# Patient Record
(demographics unavailable — no encounter records)

---

## 2024-10-23 NOTE — HISTORY OF PRESENT ILLNESS
[TextBox_4] : Patient coming for follow-up overall she is feeling better status post admission for hemorrhoidectomy did well She is still anemic getting iron infusion which did help her shortness of breath now denied cough or wheeze no shortness of breath she is on Wixela and Spiriva Asthma profile IgE CBC reviewed discussed with patient New CBC reviewed hemoglobin 9.9 Pending sleep study

## 2024-10-23 NOTE — REVIEW OF SYSTEMS
[Cough] : no cough [Chest Tightness] : no chest tightness [Wheezing] : no wheezing [GERD] : gerd [Anemia] : anemia [Negative] : Endocrine

## 2024-10-23 NOTE — PHYSICAL EXAM
[No Acute Distress] : no acute distress [IV] : Mallampati Class: IV [Normal Appearance] : normal appearance [No Neck Mass] : no neck mass [Normal Rate/Rhythm] : normal rate/rhythm [No Resp Distress] : no resp distress [Clear to Auscultation Bilaterally] : clear to auscultation bilaterally [No Abnormalities] : no abnormalities [Normal Gait] : normal gait [No Clubbing] : no clubbing [No Cyanosis] : no cyanosis [No Edema] : no edema [FROM] : FROM [No Focal Deficits] : no focal deficits [Oriented x3] : oriented x3 [Normal Affect] : normal affect

## 2024-10-23 NOTE — PLAN
[TextEntry] : continue spiriva and wixela  ct scan in 4 months  pending sleep study  will proceed with sleep study  counseled for weight reduction  told not to drive if feel tired  counseled for sleep hygiene iron infusion

## 2024-12-31 NOTE — PLAN
[TextEntry] : continue spiriva and wixela  azelastine as needed  ct scan reviewed  next visit will order asthma profile , IGE , cbc  again told need sleep study was ordered before

## 2024-12-31 NOTE — HISTORY OF PRESENT ILLNESS
[TextBox_4] : Patient coming for follow-up in November she was complaining of cough admitted to the hospital.  This told her that she has a pneumonia given antibiotic patient remained to have a cough went back had CT scan again which showed some rib fracture show patient has 2 CAT scan November both of them did not show any sign of pneumonia or opacity second was showed rib fracture patient denies any for she said that she do have osteoporosis currently her cough almost resolved no wheezing no chest pain asthma profile from before was reviewed was negative IgE was normal most likely patient was at prednisone Told the patient not to take any prednisone unless she talked with me even if ordered by her urgent care or PMD

## 2024-12-31 NOTE — REVIEW OF SYSTEMS
[Cough] : cough [GERD] : gerd [Anemia] : anemia [Negative] : Endocrine [Chest Tightness] : no chest tightness [Wheezing] : no wheezing

## 2025-03-13 NOTE — PHYSICAL EXAM
[General Appearance - Alert] : alert [General Appearance - In No Acute Distress] : in no acute distress [Sclera] : the sclera and conjunctiva were normal [PERRL With Normal Accommodation] : pupils were equal in size, round, and reactive to light [Extraocular Movements] : extraocular movements were intact [Outer Ear] : the ears and nose were normal in appearance [Oropharynx] : the oropharynx was normal [Neck Appearance] : the appearance of the neck was normal [Neck Cervical Mass (___cm)] : no neck mass was observed [Jugular Venous Distention Increased] : there was no jugular-venous distention [Thyroid Diffuse Enlargement] : the thyroid was not enlarged [Thyroid Nodule] : there were no palpable thyroid nodules [Auscultation Breath Sounds / Voice Sounds] : lungs were clear to auscultation bilaterally [Heart Rate And Rhythm] : heart rate was normal and rhythm regular [Heart Sounds] : normal S1 and S2 [Heart Sounds Gallop] : no gallops [Murmurs] : no murmurs [Heart Sounds Pericardial Friction Rub] : no pericardial rub [Examination Of The Chest] : the chest was normal in appearance [Chest Visual Inspection Thoracic Asymmetry] : no chest asymmetry [Bowel Sounds] : normal bowel sounds [Abdomen Soft] : soft [Abdomen Tenderness] : non-tender [Abdomen Mass (___ Cm)] : no abdominal mass palpated [Abnormal Walk] : normal gait [Nail Clubbing] : no clubbing  or cyanosis of the fingernails [Musculoskeletal - Swelling] : no joint swelling seen [Motor Tone] : muscle strength and tone were normal [Skin Color & Pigmentation] : normal skin color and pigmentation [Skin Turgor] : normal skin turgor [] : no rash [Deep Tendon Reflexes (DTR)] : deep tendon reflexes were 2+ and symmetric [Sensation] : the sensory exam was normal to light touch and pinprick [No Focal Deficits] : no focal deficits [Oriented To Time, Place, And Person] : oriented to person, place, and time [Impaired Insight] : insight and judgment were intact [Affect] : the affect was normal [FreeTextEntry1] : Left posterior thorax tender to palpation

## 2025-03-13 NOTE — HISTORY OF PRESENT ILLNESS
[FreeTextEntry1] :  Ms. SANDRA CHUNG is a 58 year F, former smoker, (social smoker x 15 years) that arrives today for a consultation for Left rib (7-9) fractures. seen on CT Chest @ [facility] (3/6/25)of note, GGOs of B/L apices, subcentimeter RML (4 mm) and LLL (3mm) nodules, Patient reported to ED for URI symptoms and Left pleuritic pain (11/2024), secondary to coughing. Patient is followed by their Pulmonologist.  Recent DEXA scan reveals OP and patient now on reclast. Patient verbalizes she is still in significant pain, takes NSAIDs and states minimally effective.  PMHX: Asthma, anxiety, AVEL, GERD / esophageal dysmotility, non-Hodgkin's lymphoma, s/p open radiation in chest, & Chemo(1999),  SVC syndrome with emergent radiation  pseudotumor cerebri, vertigo, anemia, Left rib fractures (7-9), laminectomy L4, hemorrhoidectomy. Here today for surgical discussion.    Their Healthcare team is as follows: PMD: Bryon Cardiologist: Tyrell Pulmonologist: Bhavesh Rheum: Litsebastian FOUNTAIN Anastacia    ECOG 0, Independent Lives with  Former smoker- Social smoker COVID History- Vaccinated Denies major psychiatric history  Disabled, works part time in Retail

## 2025-03-13 NOTE — ASSESSMENT
[FreeTextEntry1] : CT Chest (@ [facility]) images reviewed and discussed, revealing:: -Left posterolateral 7-9 non-displaced rib fractures -Trace loculated left pleural effusion Of note, multiple subcentimeter nodules & GGOs -7 mm right apical GGO   -10 mm GGO (at the periphery of the left lung apex) -4 mm subpleural solid lung nodule RML -3 mm nodule at the periphery of the LLL -patient presents concerns regarding how long it is taking for ribs to heal -Tenderness to deep palpation, however denies any SOB  -Plan # Rib Fractures -Appears to be healing from earlier imaging -Repair would require implanting hardware such as plates and screws -risks outweigh benefits -Patient does not require surgical intervention at this time -Referral available to patient for pain management, she is not interested at this time -F/U Pulm: Dr. Ospina for continued management  -F/U CTS JUANA BUTTS, Merritt Omalley saw, examined and reviewed the diagnostic images on patient:  SANDRA CHUNG on 03/11/2025 and agreed with my Nurse Practitioner's clinical note, physical exam findings and treatment plan. Patient presented to the office referred by pulmonary with diagnosis of blunt trauma to the chest and broken ribs.  Patient is a 58-year-old female who is not November 2024 after forceful episode of coughing developed sudden onset left-sided chest pain visit to the ED and CT scan revealed mildly displaced posterior rib fractures 7-9.  No hemothorax or pneumothorax.  Patient presented to the office asking for additional treatment options.  Overall the pain has been improving still patient endorses mild discomfort in the left lower posterior chest wall but in general he has improved.  I reviewed the CT scan images with the patient and discussed surgical treatment of rib fractures particularly with angulation and malunion and reassured her that she did not meet criteria for surgical rib fixation.  I described anyway thoracoscopic rib plating surgical risk and possible complications as well as expected recovery.  At this time patient will continue with pain medicine.  No need for surgical intervention.  Return to thoracic surgery as needed.  Continue care by pulmonary.

## 2025-03-13 NOTE — DATA REVIEWED
[FreeTextEntry1] : Full Report IMPRESSION:    Recent displaced and nondisplaced fractures of the left posterolateral seventh through ninth ribs. Trace partially loculated left pleural effusion.   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 Multiple lung nodules as described, including two dominant groundglass nodules in the lung apices measuring up to 10 mm. Management Recommendations: Although clinical correlations should be integrated for each individual patient, based on the consensus statement published by the Fleischner Society the [facility] collaborative imaging recommendations for multiple >= 6 mm subsolid nodules are as follows:   Follow up CT at 3 to 6 months. Subsequent management is based on the most suspicious nodule(s).     (Radiology (2017) http://pubs.rsna.org/doi/pdf/10.1148/radiol.7878199301     CT CHEST WITHOUT IV CONTRAST   CLINICAL INDICATION: Rib fractures and pleural fluid on recent radiograph   COMPARISON:   3/4/2025 left rib radiographs.   TECHNIQUE: Noncontrast chest CT was performed.  Multiplanar, (axial, sagittal and coronal)  CT and HRCT images were reviewed.   FINDINGS:    LUNGS, AIRWAYS, PLEURA: Central airways are patent. Mild diffuse bronchial wall thickening. Changes of radiation fibrosis are seen centered in the paramediastinal upper lobes and extending to the superior segment lower lobes. Trace partially loculated left pleural effusion with mild partial compressive atelectasis in the basilar left lower lobe. Trace amount of fluid is seen tracking along the left oblique fissure.   7 mm right apical groundglass nodule is seen (series 5, image 53), as well as another 10 mm groundglass nodule at the periphery of the left lung apex (series 5, image 71). Few additional punctate solid lung nodules are annotated on series 5, such as a 4 mm subpleural nodule in the middle lobe (series 5, image 180) and a 3 mm nodule at the periphery of the left lower lobe (series 5, image 276).   MEDIASTINUM, CONTRERAS, LYMPH NODES: No significant intrathoracic adenopathy by CT size criteria   HEART AND VESSELS: Normal heart size. Trace pericardial effusion. Mild coronary artery calcifications. Dense aortic valve leaflet calcifications. Normal caliber thoracic aorta with mild scattered atherosclerotic calcifications. Normal caliber central pulmonary arteries.   UPPER ABDOMEN: No acute upper abdominal findings. Prior cholecystectomy.   BONES AND SOFT TISSUES: Visualized soft tissues are unremarkable. Recent fractures are seen involving the left posterolateral seventh through ninth ribs, with the left posterior seventh rib fracture demonstrating mild displacement. No aggressive osseous lesions.   LOWER NECK: No lower neck masses identified.     Electronic Signature: I personally reviewed the images and agree with this report. Final Report: Dictated by  and Signed by Attending Abdias Montero MD 3/7/2025 8:19 AM External Result Report

## 2025-03-13 NOTE — DATA REVIEWED
[FreeTextEntry1] : Full Report IMPRESSION:    Recent displaced and nondisplaced fractures of the left posterolateral seventh through ninth ribs. Trace partially loculated left pleural effusion.   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 Multiple lung nodules as described, including two dominant groundglass nodules in the lung apices measuring up to 10 mm. Management Recommendations: Although clinical correlations should be integrated for each individual patient, based on the consensus statement published by the Fleischner Society the [facility] collaborative imaging recommendations for multiple >= 6 mm subsolid nodules are as follows:   Follow up CT at 3 to 6 months. Subsequent management is based on the most suspicious nodule(s).     (Radiology (2017) http://pubs.rsna.org/doi/pdf/10.1148/radiol.[email or web address]     CT CHEST WITHOUT IV CONTRAST   CLINICAL INDICATION: Rib fractures and pleural fluid on recent radiograph   COMPARISON:   3/4/2025 left rib radiographs.   TECHNIQUE: Noncontrast chest CT was performed.  Multiplanar, (axial, sagittal and coronal)  CT and HRCT images were reviewed.   FINDINGS:    LUNGS, AIRWAYS, PLEURA: Central airways are patent. Mild diffuse bronchial wall thickening. Changes of radiation fibrosis are seen centered in the paramediastinal upper lobes and extending to the superior segment lower lobes. Trace partially loculated left pleural effusion with mild partial compressive atelectasis in the basilar left lower lobe. Trace amount of fluid is seen tracking along the left oblique fissure.   7 mm right apical groundglass nodule is seen (series 5, image 53), as well as another 10 mm groundglass nodule at the periphery of the left lung apex (series 5, image 71). Few additional punctate solid lung nodules are annotated on series 5, such as a 4 mm subpleural nodule in the middle lobe (series 5, image 180) and a 3 mm nodule at the periphery of the left lower lobe (series 5, image 276).   MEDIASTINUM, CONTRERAS, LYMPH NODES: No significant intrathoracic adenopathy by CT size criteria   HEART AND VESSELS: Normal heart size. Trace pericardial effusion. Mild coronary artery calcifications. Dense aortic valve leaflet calcifications. Normal caliber thoracic aorta with mild scattered atherosclerotic calcifications. Normal caliber central pulmonary arteries.   UPPER ABDOMEN: No acute upper abdominal findings. Prior cholecystectomy.   BONES AND SOFT TISSUES: Visualized soft tissues are unremarkable. Recent fractures are seen involving the left posterolateral seventh through ninth ribs, with the left posterior seventh rib fracture demonstrating mild displacement. No aggressive osseous lesions.   LOWER NECK: No lower neck masses identified.     Electronic Signature: I personally reviewed the images and agree with this report. Final Report: Dictated by  and Signed by Attending Abdias Montero MD 3/7/2025 8:19 AM External Result Report

## 2025-03-31 NOTE — PLAN
[TextEntry] : Continue Wixela every 12 hours Albuterol as needed Azelastine at night as needed Blood for CBC IgE asthma profile ordered previous 1 was done at that time patient was taking prednisone Since she still needs sleep study was ordered before not done yet we will reevaluate and discuss next visit Told the patient to get the disc of the last CT scan done at [facility] and to take it to regional radiology where she did the recent CT scan to compare so that they can add an addendum to see if these nodules are stable or new as per now we will repeat CAT scan in 3 to 6 months Follow-up in June 2025

## 2025-03-31 NOTE — HISTORY OF PRESENT ILLNESS
[TextBox_4] : Patient came today for follow-up did not do sleep study she said like 2 weeks ago at night when she wake up she felt dizzy and syncopized hit her right side and her head went to the hospital at [facility] had a blood work told everything normal Patient had CT scan in March 3 result reviewed still have the rib fracture patient was referred to CT surgery they said no intervention CT surgery note reviewed Currently she is on Advair every 12 hours she is off Spiriva no recent exacerbation no cough or wheeze currently

## 2025-05-20 NOTE — HISTORY OF PRESENT ILLNESS
[FreeTextEntry1] : Flora Sim is 58 year old female with PMH Asthma, Bronchiectasis, HLD, GERD, Non Hodgkins Lymphoma, AVEL, Chronic Back Pain r/t herniated discs, s/p spinal surgery (2021), presents in office today for evaluation s/p hospitalization.   Flora was last seen in 3/2021   2 months ago, Flora woke at 1 AM to go to the bathroom. As she walked, she had a painful back spasm. Then she suddenly saw "picture of colors" pass in front of her eyes, followed by "black color" passing in front of her eyes. Her legs "went out under her" and she fell to her side. She is unsure if she hit her head. She does not think she lost consciousness. Her  and daughter heard her fall, found her on the floor, and laid her on the bed. Flora then experienced a few minutes of dizziness. Denies feeling clammy, hot, diaphoresis, palpitations, nausea/vomiting. EMS arrived and was told vital signs normal; she did not seek emergency medical care.   She followed up with PCP the next day, who sent her to [facility]. She was hospitalized for 3 days. She was told cardiac workup was negative and REEG was normal. ([facility] documentation requested).   Since this incident, Flora complains of "brain fog" and difficulty word finding. No issues with concentration.  Denies mood issues, balance issues, memory issues, headaches, visual issues, sleep issues.

## 2025-05-20 NOTE — DISCUSSION/SUMMARY
[FreeTextEntry1] : Flora Sim is 58 year old female with PMH Asthma, Bronchiectasis, HLD, GERD, Non Hodgkins Lymphoma, AVEL, Chronic Back Pain r/t herniated discs, s/p spinal surgery (2021), presents in office today for evaluation s/p hospitalization. Based on description, event is highly suggestive of vertigo: central versus peripheral.    Plan:  -MRA head and neck w/o contrast. Will call with results.   -Prescribed Ativan 1mg for anxiety. To be taken before MRA   -[facility] documentation requested. Will review.  -Instructed to call office if any issues/events.   Case Discussed with Dr. Dede Mehta, NP

## 2025-05-20 NOTE — DISCUSSION/SUMMARY
[FreeTextEntry1] : Flora Sim is 58 year old female with PMH Asthma, Bronchiectasis, HLD, GERD, Non Hodgkins Lymphoma, VAEL, Chronic Back Pain r/t herniated discs, s/p spinal surgery (2021), presents in office today for evaluation s/p hospitalization. Based on description, event is highly suggestive of vertigo: central versus peripheral.    Plan:  -MRA head and neck w/o contrast. Will call with results.   -Prescribed Ativan 1mg for anxiety. To be taken before MRA   -[facility] documentation requested. Will review.  -Instructed to call office if any issues/events.   Case Discussed with Dr. Dede Mehta, NP

## 2025-05-20 NOTE — PHYSICAL EXAM
[General Appearance - Alert] : alert [General Appearance - In No Acute Distress] : in no acute distress [Oriented To Time, Place, And Person] : oriented to person, place, and time [Affect] : the affect was normal [Person] : oriented to person [Place] : oriented to place [Time] : oriented to time [Short Term Intact] : short term memory intact [Remote Intact] : remote memory intact [Span Intact] : the attention span was normal [Concentration Intact] : normal concentrating ability [Repeating Phrases] : no difficulty repeating a phrase [Fluency] : fluency intact [Comprehension] : comprehension intact [Cranial Nerves Optic (II)] : visual acuity intact bilaterally,  visual fields full to confrontation, pupils equal round and reactive to light [Cranial Nerves Oculomotor (III)] : extraocular motion intact [Cranial Nerves Trigeminal (V)] : facial sensation intact symmetrically [Cranial Nerves Facial (VII)] : face symmetrical [Cranial Nerves Vestibulocochlear (VIII)] : hearing was intact bilaterally [Cranial Nerves Accessory (XI - Cranial And Spinal)] : head turning and shoulder shrug symmetric [Cranial Nerves Hypoglossal (XII)] : there was no tongue deviation with protrusion [Motor Tone] : muscle tone was normal in all four extremities [Involuntary Movements] : no involuntary movements were seen [Abnormal Walk] : normal gait [Balance] : balance was intact [1+] : Ankle jerk left 1+ [Paresis Pronator Drift Right-Sided] : no pronator drift on the right [Paresis Pronator Drift Left-Sided] : no pronator drift on the left [Motor Strength Upper Extremities Bilaterally] : strength was normal in both upper extremities [Motor Strength Lower Extremities Bilaterally] : strength was normal in both lower extremities [Romberg's Sign] : Romberg's sign was negtive [Past-pointing] : there was no past-pointing [Tremor] : no tremor present [Coordination - Dysmetria Impaired Finger-to-Nose Bilateral] : not present

## 2025-05-20 NOTE — HISTORY OF PRESENT ILLNESS
[FreeTextEntry1] : Flora Sim is 58 year old female with PMH Asthma, Bronchiectasis, HLD, GERD, Non Hodgkins Lymphoma, AVEL, Chronic Back Pain r/t herniated discs, s/p spinal surgery (2021), presents in office today for evaluation s/p hospitalization.   Flora was last seen in 3/2021   2 months ago, Flora woke at 1 AM to go to the bathroom. As she walked, she had a painful back spasm. Then she suddenly saw "picture of colors" pass in front of her eyes, followed by "black color" passing in front of her eyes. Her legs "went out under her" and she fell to her side. She is unsure if she hit her head. She does not think she lost consciousness. Her  and daughter heard her fall, found her on the floor, and laid her on the bed. Flora then experienced a few minutes of dizziness. Denies feeling clammy, hot, diaphoresis, palpitations, nausea/vomiting. EMS arrived and was told vital signs normal; she did not seek emergency medical care.   She followed up with PCP the next day, who sent her to [facility]. She was hospitalized for 3 days. She was told cardiac workup was negative and REEG was normal. ([facility] documentation requested).   Since this incident, Flora complains of "brain fog" and difficulty word finding. No issues with concentration.  Denies mood issues, balance issues, memory issues, headaches, visual issues, sleep issues.

## 2025-06-02 NOTE — PLAN
[TextEntry] : Will switch Wixela to Breo as being covered by the insurance Albuterol as needed Azelastine at night as needed CT scan end of the year patient was informed will proceed with sleep study  counseled for weight reduction  told not to drive if feel tired  counseled for sleep hygiene

## 2025-06-02 NOTE — HISTORY OF PRESENT ILLNESS
[TextBox_4] : Patient came today for follow-up overall breathing wise feels better still has some mild intermittent cough currently no wheezing no recent exacerbation CT scan from March reviewed and the addendum note was reviewed also Will need repeat CAT scan by end of the year Patient did not do the sleep study yet said no one called her she do snore at night have daytime fatigue somnolence also been followed by rheumatology and by neurology for syncope versus vertigo